# Patient Record
Sex: FEMALE | Race: WHITE | URBAN - METROPOLITAN AREA
[De-identification: names, ages, dates, MRNs, and addresses within clinical notes are randomized per-mention and may not be internally consistent; named-entity substitution may affect disease eponyms.]

---

## 2021-02-06 ENCOUNTER — OUT OF OFFICE VISIT (OUTPATIENT)
Dept: URBAN - METROPOLITAN AREA MEDICAL CENTER 25 | Facility: MEDICAL CENTER | Age: 46
End: 2021-02-06
Payer: COMMERCIAL

## 2021-02-06 DIAGNOSIS — R93.3 ABN FINDINGS-GI TRACT: ICD-10-CM

## 2021-02-06 DIAGNOSIS — K85.80 OTHER ACUTE PANCREATITIS WITHOUT INFECTION OR NECROSIS: ICD-10-CM

## 2021-02-06 PROCEDURE — 99232 SBSQ HOSP IP/OBS MODERATE 35: CPT | Performed by: INTERNAL MEDICINE

## 2021-02-06 PROCEDURE — 99254 IP/OBS CNSLTJ NEW/EST MOD 60: CPT | Performed by: INTERNAL MEDICINE

## 2021-03-29 ENCOUNTER — TELEPHONE ENCOUNTER (OUTPATIENT)
Dept: URBAN - METROPOLITAN AREA CLINIC 19 | Facility: CLINIC | Age: 46
End: 2021-03-29

## 2021-03-29 ENCOUNTER — WEB ENCOUNTER (OUTPATIENT)
Dept: URBAN - METROPOLITAN AREA CLINIC 128 | Facility: CLINIC | Age: 46
End: 2021-03-29

## 2021-03-29 ENCOUNTER — DASHBOARD ENCOUNTERS (OUTPATIENT)
Age: 46
End: 2021-03-29

## 2021-03-29 ENCOUNTER — OFFICE VISIT (OUTPATIENT)
Dept: URBAN - METROPOLITAN AREA CLINIC 128 | Facility: CLINIC | Age: 46
End: 2021-03-29
Payer: COMMERCIAL

## 2021-03-29 DIAGNOSIS — K85.00 IDIOPATHIC ACUTE PANCREATITIS WITHOUT INFECTION OR NECROSIS: ICD-10-CM

## 2021-03-29 DIAGNOSIS — D50.0 IRON DEFICIENCY ANEMIA DUE TO CHRONIC BLOOD LOSS: ICD-10-CM

## 2021-03-29 PROCEDURE — 99214 OFFICE O/P EST MOD 30 MIN: CPT | Performed by: INTERNAL MEDICINE

## 2021-03-29 RX ORDER — IRON SUCROSE 20 MG/ML
500MG INJECTION, SOLUTION INTRAVENOUS
Qty: 1 | Refills: 1 | OUTPATIENT
Start: 2021-03-29

## 2021-03-29 NOTE — HPI-TODAY'S VISIT:
Mrs. Vernon is a 46 year old female who presents to GI clinic following hospitalization on 2/5/2021 for acute pancreatitis. She reports this was the first time she had pancreatitis.     She had a CT A/P with her PCP for abdomina pain which showed acute pancreatitis. CT report mentioned "fullness at the pancreatic head may be related to underlying pancreatitis. However, attention on follow-up once acute symptoms resolve to excluding underlying lesion."  She reports not drinking; she report rarely drinking.   She had a RUQ US that was negative for gallstones; CBD was normal caliber.   Triglycerides were 166 and lipase was 935 on 2/6/2021.   Today she reports feeling well.  She reports having a history of iron deficiency anemia since she started having periods. Her HgB was 9.4 with iron saturation of 5%. She reports having heavy periods for years and continues to have ABEL despite taking PO iron supplementation.

## 2021-04-15 ENCOUNTER — OFFICE VISIT (OUTPATIENT)
Dept: URBAN - METROPOLITAN AREA CLINIC 79 | Facility: CLINIC | Age: 46
End: 2021-04-15

## 2021-04-15 RX ORDER — IRON SUCROSE 20 MG/ML
500MG INJECTION, SOLUTION INTRAVENOUS
Qty: 1 | Refills: 1 | Status: ACTIVE | COMMUNITY
Start: 2021-03-29

## 2021-04-21 ENCOUNTER — TELEPHONE ENCOUNTER (OUTPATIENT)
Dept: URBAN - METROPOLITAN AREA CLINIC 19 | Facility: CLINIC | Age: 46
End: 2021-04-21

## 2021-04-21 RX ORDER — IRON SUCROSE 20 MG/ML
200MG INJECTION, SOLUTION INTRAVENOUS
Refills: 4 | OUTPATIENT
Start: 2021-04-22

## 2021-04-29 ENCOUNTER — OFFICE VISIT (OUTPATIENT)
Dept: URBAN - METROPOLITAN AREA CLINIC 79 | Facility: CLINIC | Age: 46
End: 2021-04-29

## 2021-06-11 PROBLEM — 724556004: Status: ACTIVE | Noted: 2021-03-29

## 2021-06-14 ENCOUNTER — OFFICE VISIT (OUTPATIENT)
Dept: URBAN - METROPOLITAN AREA CLINIC 79 | Facility: CLINIC | Age: 46
End: 2021-06-14
Payer: COMMERCIAL

## 2021-06-14 VITALS
TEMPERATURE: 97.3 F | WEIGHT: 271 LBS | SYSTOLIC BLOOD PRESSURE: 154 MMHG | HEART RATE: 100 BPM | DIASTOLIC BLOOD PRESSURE: 83 MMHG | RESPIRATION RATE: 20 BRPM

## 2021-06-14 DIAGNOSIS — D50.8 ACQUIRED IRON DEFICIENCY ANEMIA DUE TO DECREASED ABSORPTION: ICD-10-CM

## 2021-06-14 PROCEDURE — 96365 THER/PROPH/DIAG IV INF INIT: CPT | Performed by: INTERNAL MEDICINE

## 2021-06-14 PROCEDURE — 96366 THER/PROPH/DIAG IV INF ADDON: CPT | Performed by: INTERNAL MEDICINE

## 2021-06-14 RX ORDER — IRON SUCROSE 20 MG/ML
200MG INJECTION, SOLUTION INTRAVENOUS
Refills: 4 | Status: ACTIVE | COMMUNITY
Start: 2021-04-22

## 2021-06-14 RX ORDER — IRON SUCROSE 20 MG/ML
500MG INJECTION, SOLUTION INTRAVENOUS
Qty: 1 | Refills: 1 | Status: ACTIVE | COMMUNITY
Start: 2021-03-29

## 2021-06-28 ENCOUNTER — OFFICE VISIT (OUTPATIENT)
Dept: URBAN - METROPOLITAN AREA CLINIC 79 | Facility: CLINIC | Age: 46
End: 2021-06-28
Payer: COMMERCIAL

## 2021-06-28 VITALS
HEART RATE: 100 BPM | SYSTOLIC BLOOD PRESSURE: 134 MMHG | DIASTOLIC BLOOD PRESSURE: 84 MMHG | WEIGHT: 272 LBS | RESPIRATION RATE: 20 BRPM | TEMPERATURE: 97.3 F

## 2021-06-28 DIAGNOSIS — D50.0 ANEMIA DUE TO BLOOD LOSS: ICD-10-CM

## 2021-06-28 PROCEDURE — 96365 THER/PROPH/DIAG IV INF INIT: CPT | Performed by: INTERNAL MEDICINE

## 2021-06-28 PROCEDURE — 96366 THER/PROPH/DIAG IV INF ADDON: CPT | Performed by: INTERNAL MEDICINE

## 2021-06-28 RX ORDER — IRON SUCROSE 20 MG/ML
200MG INJECTION, SOLUTION INTRAVENOUS
Refills: 4 | Status: ACTIVE | COMMUNITY
Start: 2021-04-22

## 2021-06-28 RX ORDER — IRON SUCROSE 20 MG/ML
500MG INJECTION, SOLUTION INTRAVENOUS
Qty: 1 | Refills: 1 | Status: ACTIVE | COMMUNITY
Start: 2021-03-29